# Patient Record
(demographics unavailable — no encounter records)

---

## 2024-11-01 NOTE — HISTORY OF PRESENT ILLNESS
[Obstructive Sleep Apnea] : obstructive sleep apnea [Awakes Unrefreshed] : awakes unrefreshed [Awakes with Dry Mouth] : awakes with dry mouth [Awakes with Headache] : awakes with headache [Snoring] : snoring [Daytime Somnolence] : daytime somnolence [Unintentional Sleep while Inactive] : unintentional sleep while inactive [TextBox_4] : 40-year-old patient Sleep evaluation History of obstructive sleep apnea States he believes he has an old Hayley Respironics He does not believe he ever received any recall information Machine is not functioning well No history of pulmonary pathology No history of asthma pneumonia bronchitis emphysema COPD Medications none

## 2024-11-01 NOTE — DISCUSSION/SUMMARY
[FreeTextEntry1] : Obstructive sleep apnea Patient has classic signs and symptoms as noted Sawtooth pattern on pulmonary inspiratory expiratory flow volume loop History of documented obstructive sleep apnea Patient has not  Hayley Respironics which he states he never received any information regarding recall Will order formal home sleep study Readdress with new ResMed device was once confirmed pathophysiology treatment protocols goals discussed

## 2024-11-01 NOTE — PROCEDURE
[FreeTextEntry1] : Chest x-ray PA lateral November 1, 2024 Baseline Cardiac size is normal Very minimal elevation right hemidiaphragm Otherwise lung fields are clear without parenchymal infiltrate pleural effusion dominant pulmonary nodule Soft tissue bony structures unremarkable Mediastinum hilum unremarkable Overall impression clear lungs  PFT November 1, 2024 Indication obstructive sleep apnea Spirometric flow rates normal range Lung volumes normal Decreased ERV secondary to increased abdominal girth Positive air trapping likely secondary to weight Diffusion 93% predicted normal range Hemoglobin 15.1 Sawtooth pattern consistent with known history of obstructive sleep apnea

## 2024-11-01 NOTE — PHYSICAL EXAM
[No Acute Distress] : no acute distress [Normal Oropharynx] : normal oropharynx [Low Lying Soft Palate] : low lying soft palate [IV] : Mallampati Class: IV [Normal Appearance] : normal appearance [No Neck Mass] : no neck mass [Normal Rate/Rhythm] : normal rate/rhythm [Normal S1, S2] : normal s1, s2 [No Murmurs] : no murmurs [No Resp Distress] : no resp distress [No Acc Muscle Use] : no acc muscle use [Normal Palpation] : normal palpation [Clear to Auscultation Bilaterally] : clear to auscultation bilaterally [Normal to Percussion] : normal to percussion [No Abnormalities] : no abnormalities [Benign] : benign [Not Tender] : not tender [Soft] : soft [No HSM] : no hsm [Normal Bowel Sounds] : normal bowel sounds [Normal Gait] : normal gait [No Clubbing] : no clubbing [No Cyanosis] : no cyanosis [No Edema] : no edema [FROM] : FROM [Normal Color/ Pigmentation] : normal color/ pigmentation [No Focal Deficits] : no focal deficits [Oriented x3] : oriented x3 [Normal Affect] : normal affect

## 2024-11-22 NOTE — HISTORY OF PRESENT ILLNESS
[TextBox_4] : Follow-up home sleep study High clinical suspicion obstructive sleep apnea Patient with a prior history of noted sleep apnea Severe obstructive apnea based on sleep study AHI 46 Patient has an old CPAP machine machine [Obstructive Sleep Apnea] : obstructive sleep apnea [TextBox_108] : 46

## 2024-11-22 NOTE — REASON FOR VISIT
[Home] : at home, [unfilled] , at the time of the visit. [Medical Office: ([facility])___] : at the medical office located in  [Patient] : the patient

## 2024-11-22 NOTE — DISCUSSION/SUMMARY
[FreeTextEntry1] : Sleep study November 16, 2024 2 night study Severe obstructive sleep apnea Nonpositional AHI 46 Percent time less than 90% 26% on room air study Percent time snoring greater than 30 dB 16.7% Frequent pulse rate variability during study Severe very severe RDI 62 Overall impression severe obstructive sleep apnea Addressed with patient's auto CPAP treatment protocol other options include a CPAP titration study Check O2 saturations post start of treatment therapy

## 2024-12-27 NOTE — DISCUSSION/SUMMARY
[FreeTextEntry1] : Obstructive sleep apnea note at follow-up arranged overnight oximetry to recheck O2 saturations on room air with CPAP device in place AHI 46-started ResMed CPAP settings 6 to 18 cm H2O Normalization of AHI 1.3 Nasal pillow Patient compliant with CPAP therapy.  Continue present settings.  Patient with demonstrated clinical benefit with daytime and nocturnal symptomatology improvement. Patient has classic signs and symptoms as noted Sawtooth pattern on pulmonary inspiratory expiratory flow volume loop History of documented obstructive sleep apnea Patient has not  Swyft [employer] which he states he never received any information regarding recall Will order formal home sleep study Readdress with new ResMed device was once confirmed pathophysiology treatment protocols goals discussed

## 2024-12-27 NOTE — PROCEDURE
[FreeTextEntry1] : ResMed CPAP device 12/23/2024 Patient just started use Has had machine for the 2 weeks Usage 87% Hours greater than 7 AutoSet CPAP 6 to 18 cm H2O AHI 1.3 No air leak   Chest x-ray PA lateral November 1, 2024 Baseline Cardiac size is normal Very minimal elevation right hemidiaphragm Otherwise lung fields are clear without parenchymal infiltrate pleural effusion dominant pulmonary nodule Soft tissue bony structures unremarkable Mediastinum hilum unremarkable Overall impression clear lungs  PFT November 1, 2024 Indication obstructive sleep apnea Spirometric flow rates normal range Lung volumes normal Decreased ERV secondary to increased abdominal girth Positive air trapping likely secondary to weight Diffusion 93% predicted normal range Hemoglobin 15.1 Sawtooth pattern consistent with known history of obstructive sleep apnea

## 2024-12-27 NOTE — HISTORY OF PRESENT ILLNESS
[TextBox_4] : 40-year-old patient Sleep evaluation History of obstructive sleep apnea States he believes he has an old Hayley Respironics He does not believe he ever received any recall information Machine is not functioning well No history of pulmonary pathology No history of asthma pneumonia bronchitis emphysema COPD Medications none  [Obstructive Sleep Apnea] : obstructive sleep apnea [Awakes Unrefreshed] : awakes unrefreshed [Awakes with Dry Mouth] : awakes with dry mouth [Awakes with Headache] : awakes with headache [Daytime Somnolence] : daytime somnolence [Snoring] : snoring [Unintentional Sleep while Inactive] : unintentional sleep while inactive [TextBox_100] : 11/24 [TextBox_108] : 46 [TextBox_112] : 26 [TextBox_116] : 76 [TextBox_120] : nasal  pillow

## 2025-05-15 NOTE — DISCUSSION/SUMMARY
[de-identified] : Discussed findings of today's exam and possible causes of patient's pain.  Educated patient on their most probable diagnosis of left shoulder impingement.  Reviewed possible courses of treatment, and we collaboratively decided best course of treatment at this time will include conservative management.  Patient may continue taking Aleve as needed, advised to take 2 tablets with food, 2 times a day for the next 1 to 2 weeks (We discussed all possible side effects of this medication).  Patient will be started on a course of physical therapy to restore normal range of motion and strength as tolerated.  Follow up as needed.  Patient appreciates and agrees with current plan.  This note was generated using dragon medical dictation software.  A reasonable effort has been made for proofreading its contents, but typos may still remain.  If there are any questions or points of clarification needed please notify my office.

## 2025-05-15 NOTE — PHYSICAL EXAM
[de-identified] : Constitutional: Well-nourished, well-developed, No acute distress Respiratory:  Good respiratory effort, no SOB Psychiatric: Pleasant and normal affect, alert and oriented x3 Skin: Clean dry and intact B/L UE Musculoskeletal: normal except where as noted in regional exam     Left Shoulder: APPEARANCE: no marked deformities, no swelling or malalignment POSITIVE TENDERNESS: supraspinatus NONTENDER:  long head biceps tendon, infraspinatus, teres minor. biceps. anterior and posterior capsule. AC joint. ROM: full with mild painful arc past 60 degrees, no scapular winging or dyskinesia present RESISTIVE TESTING: MMT 4+/5 ER, Flexion and Empty can, 5/5 IR. painless 5/5 resisted ext, horizontal abd/add SPECIAL TESTS: + Anaya and Neers, mildly + cross arm adduction, + Speeds, neg Mejia's, neg Drop Arm, neg Apprehension. neg apley's scratch test  [de-identified] :  The following radiographs were ordered and read by me during this patient's visit. I reviewed each radiograph in detail with the patient and discussed the findings as highlighted below.   3 views of the left shoulder were obtained today that show no fracture, or dislocation. There are no degenerative changes seen. There is no malalignment. No obvious osseous abnormality. Otherwise unremarkable.

## 2025-05-15 NOTE — HISTORY OF PRESENT ILLNESS
[de-identified] : 41 year old RHD male presents for evaluation of left shoulder pain x 2-3 months without injury. He notes that the pain occurred insidiously and has been progressively worsening. He notes that the pain is at the anterior aspect and bicep. Pain is worse with adduction, external rotation and overhead movements. Denies radiation of pain down the arm. Does not take medications for the pain. Has not had any other treatments.